# Patient Record
Sex: MALE | Race: WHITE | NOT HISPANIC OR LATINO | Employment: FULL TIME | URBAN - METROPOLITAN AREA
[De-identification: names, ages, dates, MRNs, and addresses within clinical notes are randomized per-mention and may not be internally consistent; named-entity substitution may affect disease eponyms.]

---
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
## 2019-06-19 ENCOUNTER — OFFICE VISIT (OUTPATIENT)
Dept: URGENT CARE | Facility: PHYSICIAN GROUP | Age: 19
End: 2019-06-19
Payer: COMMERCIAL

## 2019-06-19 VITALS
BODY MASS INDEX: 20.46 KG/M2 | OXYGEN SATURATION: 98 % | WEIGHT: 135 LBS | DIASTOLIC BLOOD PRESSURE: 70 MMHG | HEART RATE: 70 BPM | HEIGHT: 68 IN | SYSTOLIC BLOOD PRESSURE: 122 MMHG | TEMPERATURE: 98.9 F

## 2019-06-19 DIAGNOSIS — Z86.69 HISTORY OF CHIARI MALFORMATION: ICD-10-CM

## 2019-06-19 DIAGNOSIS — H53.8 BLURRY VISION, BILATERAL: ICD-10-CM

## 2019-06-19 DIAGNOSIS — R51.9 OCCIPITAL HEADACHE: ICD-10-CM

## 2019-06-19 PROCEDURE — 99214 OFFICE O/P EST MOD 30 MIN: CPT | Performed by: PHYSICIAN ASSISTANT

## 2019-06-19 ASSESSMENT — ENCOUNTER SYMPTOMS
DEPRESSION: 0
TINGLING: 1
HEADACHES: 1
SEIZURES: 0
DIZZINESS: 1
LOSS OF CONSCIOUSNESS: 0
VISUAL CHANGE: 1
COUGH: 0
ABDOMINAL PAIN: 0
SENSORY CHANGE: 1
NAUSEA: 0
CHILLS: 0
MYALGIAS: 0
VOMITING: 0
FEVER: 0

## 2019-06-19 NOTE — PROGRESS NOTES
Subjective:      Preethi Joseph is a 18 y.o. male who presents with Headache (x 2 hours, back of head, hx of brain surgery, finger numbness, dizzy)            Headache    This is a new problem. The current episode started today. The problem occurs constantly. The problem has been unchanged. The pain is located in the occipital region. The pain does not radiate. Similar to prior headaches: Patient denies history of headache. The quality of the pain is described as throbbing. The pain is at a severity of 3/10. The pain is mild. Associated symptoms include dizziness, tingling and a visual change. Pertinent negatives include no abdominal pain, coughing, fever, hearing loss, nausea, seizures or vomiting. Exacerbated by: Patient reports tilting his head back to drink water and then having associated headache, blurry vision and tingling in both hands. He has tried nothing for the symptoms. The treatment provided no relief. (History of Chiari malformation)     Past Medical History:   Diagnosis Date   • Chiari malformation type I (HCC)    • Retraction syndrome      No current outpatient prescriptions on file prior to visit.     No current facility-administered medications on file prior to visit.      No family history on file.  Social History     Social History   • Marital status: Single     Spouse name: N/A   • Number of children: N/A   • Years of education: N/A     Occupational History   • Not on file.     Social History Main Topics   • Smoking status: Never Smoker   • Smokeless tobacco: Never Used   • Alcohol use No   • Drug use: No   • Sexual activity: Not on file     Other Topics Concern   • Not on file     Social History Narrative   • No narrative on file     .    Review of Systems   Constitutional: Negative for chills and fever.   HENT: Negative for congestion and hearing loss.    Respiratory: Negative for cough.    Cardiovascular: Negative for chest pain.   Gastrointestinal: Negative for abdominal pain, nausea and  "vomiting.   Genitourinary: Negative.    Musculoskeletal: Negative for myalgias.   Skin: Negative for rash.   Neurological: Positive for dizziness, tingling, sensory change and headaches. Negative for seizures and loss of consciousness.   Psychiatric/Behavioral: Negative for depression.          Objective:     /70   Pulse 70   Temp 37.2 °C (98.9 °F)   Ht 1.727 m (5' 8\")   Wt 61.2 kg (135 lb)   SpO2 98%   BMI 20.53 kg/m²      Physical Exam       Gen.: Patient is A and O ×3, no acute distress, well-nourished well-hydrated  Vitals: Are listed and unremarkable  HEENT: Heads normocephalic, atraumatic, PERRLA, extraocular movements intact, TMs and oropharynx clear  Neck: Soft supple without cervical lymphadenopathy  Cardiovascular: Regular rate and rhythm normal S1 and S2. No murmurs, rubs or gallops  Lungs are clear to auscultation bilaterally. no wheezes rales or rhonchi  Abdomen is soft, nontender, nondistended with good bowel sounds, no hepatosplenomegaly  Skin: Is well perfused without evidence of rash or lesions  Neurological:  cranial nerves II through XII were assessed and intact.  Musculoskeletal: Full range of motion, 5 out of 5 strength against resistance  Neurovascularly: Intact with a 2 second cap refill, good distal pulses       Assessment/Plan:     1. Occipital headache      2. Blurry vision, bilateral      3. History of Chiari malformation    This could just be occipital headache, however given patient's history of Chiari malformation, I sent him to the emergency room to have further evaluation.  He may need CT of the head.  They are going to the ER at [facility].  Mom is driving him now    "

## 2020-01-14 ENCOUNTER — HOSPITAL ENCOUNTER (OUTPATIENT)
Dept: LAB | Facility: MEDICAL CENTER | Age: 20
End: 2020-01-14
Payer: COMMERCIAL

## 2020-01-14 PROCEDURE — 83013 H PYLORI (C-13) BREATH: CPT

## 2020-01-15 ENCOUNTER — HOSPITAL ENCOUNTER (OUTPATIENT)
Dept: RADIOLOGY | Facility: MEDICAL CENTER | Age: 20
End: 2020-01-15
Attending: PHYSICIAN ASSISTANT
Payer: COMMERCIAL

## 2020-01-15 DIAGNOSIS — R10.13 EPIGASTRIC PAIN: ICD-10-CM

## 2020-01-15 PROCEDURE — 76700 US EXAM ABDOM COMPLETE: CPT

## 2020-12-16 ENCOUNTER — OCCUPATIONAL MEDICINE (OUTPATIENT)
Dept: URGENT CARE | Facility: PHYSICIAN GROUP | Age: 20
End: 2020-12-16
Payer: COMMERCIAL

## 2020-12-16 ENCOUNTER — TELEPHONE (OUTPATIENT)
Dept: SCHEDULING | Facility: IMAGING CENTER | Age: 20
End: 2020-12-16

## 2020-12-16 VITALS
HEART RATE: 71 BPM | SYSTOLIC BLOOD PRESSURE: 132 MMHG | DIASTOLIC BLOOD PRESSURE: 90 MMHG | TEMPERATURE: 98.2 F | HEIGHT: 67 IN | WEIGHT: 135 LBS | OXYGEN SATURATION: 97 % | BODY MASS INDEX: 21.19 KG/M2

## 2020-12-16 DIAGNOSIS — S41.112A ARM LACERATION, LEFT, INITIAL ENCOUNTER: ICD-10-CM

## 2020-12-16 PROCEDURE — 12001 RPR S/N/AX/GEN/TRNK 2.5CM/<: CPT | Mod: 29 | Performed by: NURSE PRACTITIONER

## 2020-12-16 ASSESSMENT — PAIN SCALES - GENERAL: PAINLEVEL: NO PAIN

## 2020-12-16 NOTE — LETTER
[facility]  [address] [place] [address]  Phone:  [phone] - Fax:  [phone]   [facility] Progress Report and Disability Certification  Date of Service: 12/16/2020   No Show:  No  Date / Time of Next Visit: 12/23/2020   Claim Information   Patient Name: Preethi Joseph  Claim Number:     Employer: [employer]  Date of Injury: 12/16/2020     Insurer / TPA: [employer]  ID / SSN:     Occupation:   Diagnosis: The encounter diagnosis was Arm laceration, left, initial encounter.    Medical Information   Related to Industrial Injury? Yes    Subjective Complaints:  DOI: 12/16/2020: Patient was moving stacks of pallets when one of the pallets fell forward, cutting his left forearm.  Washed it copiously with water at work, and completed his shift.  Has minimal pain, no bleeding since initial injury.  Able to move right upper extremity, hand and fingers without difficulty.  Does not have a second job.  Is right-handed.  No prior injury.    PMH: No pertinent past medical history to this problem   MEDS: Medications were reviewed in Epic   ALLERGIES: Allergies were reviewed in Epic   SOCHX: Works at [employer]   FH: No pertinent family history to this problem      Objective Findings: Left anterior forearm: 2 cm diagonal laceration present with very minimal bleeding, no drainage.  No underlying redness, minimal swelling.  Equal  and strength.  Distal N/V intact.   Pre-Existing Condition(s): None   Assessment:   Initial Visit    Status: Additional Care Required  Permanent Disability:No    Plan: Medication    Diagnostics:      Comments:  Sutures needed    Disability Information   Status: Released to Restricted Duty    From:  12/16/2020  Through: 12/23/2020 Restrictions are: Temporary   Physical Restrictions   Sitting:    Standing:    Stooping:    Bending:      Squatting:    Walking:    Climbing:    Pushing:      Pulling:    Other:    Reaching Above  Shoulder (L):   Reaching Above Shoulder (R):       Reaching Below Shoulder (L):    Reaching Below Shoulder (R):      Not to exceed Weight Limits   Carrying(hrs):   Weight Limit(lb): < or = to 10 pounds Lifting(hrs):   Weight  Limit(lb): < or = to 10 pounds   Comments: Restrictions for left upper extremity only.  Must keep wound covered, clean and dry at all times.    Repetitive Actions   Hands: i.e. Fine Manipulations from Grasping:     Feet: i.e. Operating Foot Controls:     Driving / Operate Machinery:     Provider Name:   SANDRA Mcconnell Physician Signature:  Physician Name:     Clinic Name / Location: [address] [address] Clinic Phone Number: Dept: [phone]   Appointment Time: 4:45 Pm Visit Start Time: 5:38 PM   Check-In Time:  4:51 Pm Visit Discharge Time: 6:55 PM   Original-Treating Physician or Chiropractor    Page 2-Insurer/TPA    Page 3-Employer    Page 4-Employee

## 2020-12-16 NOTE — LETTER
"EMPLOYEE’S CLAIM FOR COMPENSATION/ REPORT OF INITIAL TREATMENT  FORM C-4    EMPLOYEE’S CLAIM - PROVIDE ALL INFORMATION REQUESTED   First Name  Preethi Last Name  Opal Birthdate                    2000                Sex  male Claim Number   Home Address  [id number] Age  20 y.o. Height  1.702 m (5' 7\") Weight  61.2 kg (135 lb) [facility]     [facility] Zip  [address] Telephone  [phone] (home)    Mailing Address  [id number] [facility] Zip  [address] Primary Language Spoken  English    Insurer   Third Party   [employer]   Employee's Occupation (Job Title) When Injury or Occupational Disease Occurred      Employer's Name  [employer]  Telephone  [phone]    Employer Address  [address]  [place]  Zip  [address]    Date of Injury  12/16/2020               Hour of Injury  5:00 AM Date Employer Notified  12/16/2020 Last Day of Work after Injury     or Occupational Disease  12/16/2020 Supervisor to Whom Injury     Reported  Hardik Montano   Address or Location of Accident (if applicable)  [[address]]   What were you doing at the time of accident? (if applicable)  lifting pallets to re-intro parts    How did this injury or occupational disease occur? (Be specific an answer in detail. Use additional sheet if necessary)  I was lifting an empty pallet when the conveyer brought the next part forward catching my wrist, cutting it.   If you believe that you have an occupational disease, when did you first have knowledge of the disability and it relationship to your employment?  n/a Witnesses to the Accident  N/A      Nature of Injury or Occupational Disease  Laceration  Part(s) of Body Injured or Affected  Lower Arm (L), Lower Arm (L), Lower Arm (L)    I certify that the above is true and correct to the best of my knowledge and that I have " provided this information in order to obtain the benefits of [place]  I hereby authorize any physician, chiropractor, surgeon, practitioner, or other person, any hospital, including [facility] or [facility] hospital, any medical service organization, any insurance company, or other institution or organization to release to each other, any medical or other information, including benefits paid or payable, pertinent to this injury or disease, except information relative to diagnosis, treatment and/or counseling for AIDS, psychological conditions, alcohol or controlled substances, for which I must give specific authorization.  A Photostat of this authorization shall be as valid as the original.     Date   Place   Employee’s Signature   THIS REPORT MUST BE COMPLETED AND MAILED WITHIN 3 WORKING DAYS OF TREATMENT   Place  [facility]  Name of Facility  [facility]   Date  12/16/2020 Diagnosis  (S41.112A) Arm laceration, left, initial encounter Is there evidence the injured employee was under the              influence of alcohol and/or another controlled substance at the time of accident?   Hour  5:38 PM Description of Injury or Disease  The encounter diagnosis was Arm laceration, left, initial encounter. No   Treatment  3 sutures placed, dressing applied.  Have you advised the patient to remain off work five days or     more? No   X-Ray Findings      If Yes   From Date  To Date      From information given by the employee, together with medical evidence, can you directly connect this injury or occupational disease as job incurred?  Yes If No Full Duty    No Modified Duty  Yes   Is additional medical care by a physician indicated?  Yes If Modified Duty, Specify any Limitations / Restrictions  See D39.   Do you know of any previous injury or disease contributing to this condition or occupational  "disease?                            No   Date  12/16/2020 Print Doctor’s Name   SANDRA Mcconnell I certify the employer’s copy of  this form was mailed on:   Address  [address] Insurer’s Use Only     [facility] Zip  [address]    Provider’s Tax ID Number  [id number] Telephone  Dept: [phone]      e-ZACH Kaur  Signature:     Degree          ORIGINAL-TREATING PHYSICIAN OR CHIROPRACTOR    PAGE 2-INSURER/TPA    PAGE 3-EMPLOYER    PAGE 4-EMPLOYEE        Form C-4 (rev.10/07)          BRIEF DESCRIPTION OF RIGHTS AND BENEFITS  (Pursuant to [place] 616C.[place])    Notice of Injury or Occupational Disease (Incident Report Form C-1): If an injury or occupational disease (OD) arises out of and in the course of employment, you must provide written notice to your employer as soon as practicable, but no later than 7 days after the accident or OD. Your employer shall maintain a sufficient supply of the required forms.     Claim for Compensation (Form C-4): If medical treatment is sought, the form C-4 is available at the place of initial treatment. A completed \"Claim for Compensation\" (Form C-4) must be filed within 90 days after an accident or OD. The treating physician or chiropractor must, within 3 working days after treatment, complete and mail to the employer, the employer's insurer and third-party , the Claim for Compensation.     Medical Treatment: If you require medical treatment for your on-the-job injury or OD, you may be required to select a physician or chiropractor from a list provided by your workers’ compensation insurer, if it has contracted with an Organization for Managed Care (MCO) or Preferred Provider Organization (PPO) or providers of health care. If your employer has not entered into a contract with an MCO or PPO, you may select a physician or chiropractor from the Panel of Physicians and Chiropractors. Any medical costs related to your industrial " injury or OD will be paid by your insurer.     Temporary Total Disability (TTD): If your doctor has certified that you are unable to work for a period of at least 5 consecutive days, or 5 cumulative days in a 20-day period, or places restrictions on you that your employer does not accommodate, you may be entitled to TTD compensation.     Temporary Partial Disability (TPD): If the wage you receive upon reemployment is less than the compensation for TTD to which you are entitled, the insurer may be required to pay you TPD compensation to make up the difference. TPD can only be paid for a maximum of 24 months.     Permanent Partial Disability (PPD): When your medical condition is stable and there is an indication of a PPD as a result of your injury or OD, within 30 days, your insurer must arrange for an evaluation by a rating physician or chiropractor to determine the degree of your PPD. The amount of your PPD award depends on the date of injury, the results of the PPD evaluation and your age and wage.     Permanent Total Disability (PTD): If you are medically certified by a treating physician or chiropractor as permanently and totally disabled and have been granted a PTD status by your insurer, you are entitled to receive monthly benefits not to exceed 66 2/3% of your average monthly wage. The amount of your PTD payments is subject to reduction if you previously received a PPD award.     Vocational Rehabilitation Services: You may be eligible for vocational rehabilitation services if you are unable to return to the job due to a permanent physical impairment or permanent restrictions as a result of your injury or occupational disease.     Transportation and Per Sandra Reimbursement: You may be eligible for travel expenses and per sandra associated with medical treatment.     Reopening: You may be able to reopen your claim if your condition worsens after claim closure.     Appeal Process: If you disagree with a written  determination issued by the insurer or the insurer does not respond to your request, you may appeal to the Department of Administration, , by following the instructions contained in your determination letter. You must appeal the determination within 70 days from the date of the determination letter at [address], [address], [place], [place] [address], or [address], [address], [place], [place] [address]. If you disagree with the  decision, you may appeal to the Department of Administration, . You must file your appeal within 30 days from the date of the  decision letter at [address], [address], [place], [place] [address], or [address], [address], [place], [place] [address]. If you disagree with a decision of an , you may file a petition for judicial review with the District Court. You must do so within 30 days of the Appeal Officer’s decision. You may be represented by an  at your own expense or you may contact the [facility] for possible representation.     [place]  for Injured Workers (NAIW): If you disagree with a  decision, you may request that NAIW represent you without charge at an  Hearing. For information regarding denial of benefits, you may contact the [facility] at: [address], [address], [place] [address], [phone], or [address], [address], [place] [address], [phone]     To File a Complaint with the Division: If you wish to file a complaint with the  of the Division of Industrial Relations (DIR), please contact the Workers’ Compensation Section, [address], [place], [place] [address], telephone [phone], or [address], [address], [place], [place] [address], telephone [phone].     For assistance with Workers’ Compensation Issues: You may contact the Office of the  [facility] Consumer Health Assistance, [address], [place] [address], Toll Free 1-139.746.7150, Web site: http://govcha.[facility].nv., E-mail [email or web address].              __________________________________________________________________                              ___________________         Employee Name / Signature                                                                                                                     Date                                                                                                                                                                                       D-2 (rev. 01/20)

## 2020-12-17 ASSESSMENT — ENCOUNTER SYMPTOMS
FEVER: 0
SORE THROAT: 0
MYALGIAS: 0
ORTHOPNEA: 0
WEAKNESS: 0
DIZZINESS: 0
VOMITING: 0
COUGH: 0
SHORTNESS OF BREATH: 0
NERVOUS/ANXIOUS: 0
ABDOMINAL PAIN: 0
CHILLS: 0
NAUSEA: 0
EYE PAIN: 0
HEADACHES: 0

## 2020-12-17 NOTE — PROGRESS NOTES
"Subjective:   Preethi Joseph is a 20 y.o. male who presents for Laceration (left wrist LAC from a metal stair, last Tdap unknown)    DOI: 12/16/2020: Patient was moving stacks of pallets when one of the pallets fell forward, cutting his left forearm.  Washed it copiously with water at work, and completed his shift.  Has minimal pain, no bleeding since initial injury.  Able to move right upper extremity, hand and fingers without difficulty.  Does not have a second job.  Is right-handed.  No prior injury.    PMH: No pertinent past medical history to this problem   MEDS: Medications were reviewed in Epic   ALLERGIES: Allergies were reviewed in Epic   SOCHX: Works at [employer]   FH: No pertinent family history to this problem     Laceration         Review of Systems   Constitutional: Negative for chills, fever and malaise/fatigue.   HENT: Negative for congestion and sore throat.    Eyes: Negative for pain.   Respiratory: Negative for cough and shortness of breath.    Cardiovascular: Negative for chest pain, orthopnea and leg swelling.   Gastrointestinal: Negative for abdominal pain, nausea and vomiting.   Genitourinary: Negative for dysuria.   Musculoskeletal: Negative for myalgias.   Skin: Negative for rash.        Laceration, left arm   Neurological: Negative for dizziness, weakness and headaches.   Psychiatric/Behavioral: The patient is not nervous/anxious.    All other systems reviewed and are negative.      MEDS: No current outpatient medications on file.  ALLERGIES: No Known Allergies    Patient's PMH, SocHx, SurgHx, FamHx, Drug allergies and medications were reviewed.     Objective:   /90   Pulse 71   Temp 36.8 °C (98.2 °F)   Ht 1.702 m (5' 7\")   Wt 61.2 kg (135 lb)   SpO2 97%   BMI 21.14 kg/m²     Physical Exam  Vitals signs and nursing note reviewed.   Constitutional:       General: He is awake.      Appearance: Normal appearance. He is well-developed and normal weight.   HENT:      Head: Normocephalic " and atraumatic.      Right Ear: External ear normal.      Left Ear: External ear normal.      Nose: Nose normal.      Mouth/Throat:      Lips: Pink.      Mouth: Mucous membranes are moist.      Pharynx: Oropharynx is clear.   Eyes:      General: Lids are normal.      Extraocular Movements: Extraocular movements intact.      Conjunctiva/sclera: Conjunctivae normal.   Neck:      Musculoskeletal: Normal range of motion.   Cardiovascular:      Rate and Rhythm: Normal rate and regular rhythm.   Pulmonary:      Effort: Pulmonary effort is normal.   Musculoskeletal: Normal range of motion.   Skin:     General: Skin is warm and dry.             Comments: Left anterior forearm: 2 cm diagonal laceration present with very minimal bleeding, no drainage.  No underlying redness, minimal swelling.  Equal  and strength.  Distal N/V intact.   Neurological:      Mental Status: He is alert and oriented to person, place, and time.   Psychiatric:         Mood and Affect: Mood normal.         Behavior: Behavior normal. Behavior is cooperative.         Thought Content: Thought content normal.         Judgment: Judgment normal.       Procedure: Laceration Repair  -Risks including bleeding, nerve damage, infection, and poor cosmetic outcome discussed. Benefits and alternatives discussed.   -Clean technique with sterile instruments and suture used  -Local anesthesia with 5cc of 2% Xylocaine with lidocaine  -Closed with #3 4-0 Nylon interrupted sutures with good wound approximation  -Polysporin and dressing placed  -Patient tolerated well      Assessment/Plan:   Assessment    1. Arm laceration, left, initial encounter    See D39.   Wound care instructions provided.   Can use OTC analgesics, elevated and/or ice as needed.   Worsening/infection precautions given.   Return in 7-10 days for suture removal.

## 2020-12-23 ENCOUNTER — OCCUPATIONAL MEDICINE (OUTPATIENT)
Dept: URGENT CARE | Facility: PHYSICIAN GROUP | Age: 20
End: 2020-12-23
Payer: COMMERCIAL

## 2020-12-23 VITALS
BODY MASS INDEX: 21.19 KG/M2 | OXYGEN SATURATION: 97 % | WEIGHT: 135 LBS | DIASTOLIC BLOOD PRESSURE: 70 MMHG | HEIGHT: 67 IN | SYSTOLIC BLOOD PRESSURE: 116 MMHG | TEMPERATURE: 98.2 F | HEART RATE: 75 BPM

## 2020-12-23 DIAGNOSIS — S41.112D ARM LACERATION, LEFT, SUBSEQUENT ENCOUNTER: ICD-10-CM
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
## 2020-12-23 PROCEDURE — 99213 OFFICE O/P EST LOW 20 MIN: CPT | Performed by: NURSE PRACTITIONER

## 2020-12-23 ASSESSMENT — ENCOUNTER SYMPTOMS
NEUROLOGICAL NEGATIVE: 1
MUSCULOSKELETAL NEGATIVE: 1
ROS SKIN COMMENTS: LEFT FOREARM LACERATION
CONSTITUTIONAL NEGATIVE: 1

## 2020-12-23 ASSESSMENT — VISUAL ACUITY: OU: 1

## 2020-12-23 NOTE — PROGRESS NOTES
"Subjective:     Preethi Joseph is a 20 y.o. male who presents for Wrist Pain (left wrist pain wc fv, no pain)    Initial UC visit 12/16/2020 reviewed for continuity of care:    \"DOI: 12/16/2020: Patient was moving stacks of pallets when one of the pallets fell forward, cutting his left forearm.  Washed it copiously with water at work, and completed his shift.  Has minimal pain, no bleeding since initial injury.  Able to move right upper extremity, hand and fingers without difficulty.  Does not have a second job.  Is right-handed.  No prior injury.\"    1st follow-up UC visit today 12/23/2020:    Patient denies redness, swelling, fever, numbness, tingling, weakness, discharge, or other symptoms. Has kept original dressing on. Has been back to work within restrictions.     Patient was screened prior to rooming and denied COVID-19 diagnosis or contact with a person who has been diagnosed or is suspected to have COVID-19. During this visit, appropriate PPE was worn, hand hygiene was performed, and the patient and any visitors were masked.    PMH: No pertinent past medical history to this problem  MEDS: Medications were reviewed in Epic  ALLERGIES: Allergies were reviewed in Epic  SOCHX: Works as a  at [employer]   FH: No pertinent family history to this problem    Review of Systems   Constitutional: Negative.    Musculoskeletal: Negative.    Skin:        Left forearm laceration   Neurological: Negative.    All other systems reviewed and are negative.    Additional details per HPI.      Objective:     /70   Pulse 75   Temp 36.8 °C (98.2 °F)   Ht 1.702 m (5' 7\")   Wt 61.2 kg (135 lb)   SpO2 97%   BMI 21.14 kg/m²     Physical Exam  Vitals signs reviewed.   Constitutional:       General: He is not in acute distress.     Appearance: He is well-developed. He is not ill-appearing or toxic-appearing.   HENT:      Head: Normocephalic.      Right Ear: External ear normal.      Left Ear: External ear " normal.      Nose: Nose normal.   Eyes:      General: Vision grossly intact.      Extraocular Movements: Extraocular movements intact.      Conjunctiva/sclera: Conjunctivae normal.   Neck:      Musculoskeletal: Normal range of motion.   Cardiovascular:      Rate and Rhythm: Normal rate.      Pulses: Normal pulses.   Pulmonary:      Effort: Pulmonary effort is normal. No respiratory distress.   Musculoskeletal: Normal range of motion.         General: No deformity.      Left forearm: He exhibits laceration (Approx 2 cm straight laceration at left distal forearm, 3 sutures in place, wound edges well approximated, no surrounding erythema, edema, warmth, discharge). He exhibits no tenderness, no swelling and no deformity.   Skin:     General: Skin is warm and dry.      Capillary Refill: Capillary refill takes less than 2 seconds.      Coloration: Skin is not pale.   Neurological:      General: No focal deficit present.      Mental Status: He is alert and oriented to person, place, and time.      Sensory: No sensory deficit.      Motor: No weakness.      Coordination: Coordination normal.   Psychiatric:         Behavior: Behavior normal. Behavior is cooperative.       Assessment/Plan:     1. Arm laceration, left, subsequent encounter    2. Work related injury    Wound care performed.  Middle suture removed.  Deeper tissue layers healing well.  Superficial layers will need additional time to heal with sutures in place.  Laceration reinforced with Steri-Strip.  Discussed wound care.  Dressing change at least o  nce a day.  Keep covered while at work.  Leave open to air while at home.  Return in 5 days for recheck.  Return sooner if symptoms change or worsen.    Differential diagnosis, natural history, supportive care, over-the-counter symptom management per 's instructions, close monitoring, and indications for immediate follow-up discussed.     Patient advised to: Return in about 5 days (around  12/28/2020).    All questions answered. Patient agrees with the plan of care.

## 2020-12-23 NOTE — LETTER
"   [facility]  [address]  [place] [address]  Phone:  [phone] - Fax:  [phone]   [facility] Progress Report and Disability Certification  Date of Service: 12/23/2020   No Show:  No  Date / Time of Next Visit: 12/28/2020   Claim Information   Patient Name: Preethi Joseph  Claim Number:     Employer: [employer]  Date of Injury: 12/16/2020     Insurer / TPA: [employer]  ID / SSN:     Occupation:   Diagnosis: Diagnoses of Arm laceration, left, subsequent encounter and Work related injury were pertinent to this visit.    Medical Information   Related to Industrial Injury? Yes    Subjective Complaints:  Initial UC visit 12/16/2020 reviewed for continuity of care:    \"DOI: 12/16/2020: Patient was moving stacks of pallets when one of the pallets fell forward, cutting his left forearm.  Washed it copiously with water at work, and completed his shift.  Has minimal pain, no bleeding since initial injury.  Able to move right upper extremity, hand and fingers without difficulty.  Does not have a second job.  Is right-handed.  No prior injury.\"    1st follow-up UC visit today 12/23/2020:    Patient denies redness, swelling, fever, numbness, tingling, weakness, discharge, or other symptoms. Has kept original dressing on. Has been back to work within restrictions.   Objective Findings: Constitutional:       General: He is not in acute distress.     Appearance: He is well-developed. He is not ill-appearing or toxic-appearing.   Cardiovascular:      Rate and Rhythm: Normal rate.      Pulses: Normal pulses.   Musculoskeletal: Normal range of motion.         General: No deformity.      Left forearm: He exhibits laceration (Approx 2 cm straight laceration at left distal forearm, 3 sutures in place, wound edges well approximated, no surrounding erythema, edema, warmth, discharge). He exhibits no tenderness, no swelling and no deformity.   Skin:  " General: Skin is warm and dry.      Capillary Refill: Capillary refill takes less than 2 seconds.      Coloration: Skin is not pale.   Neurological:      General: No focal deficit present.      Mental Status: He is alert and oriented to person, place, and time.      Sensory: No sensory deficit.      Motor: No weakness.      Coordination: Coordination normal.   Pre-Existing Condition(s):     Assessment:   Condition Improved    Status: Additional Care Required  Permanent Disability:No    Plan:      Diagnostics:      Comments:  Wound care performed.  Middle suture removed.  Deeper tissue layers healing well.  Superficial layers will need additional time to heal with sutures in place.  Laceration reinforced with Steri-Strip.  Discussed wound care.  Dressing change at least o  nce a day.  Keep covered while at work.  Leave open to air while at home.  Return in 5 days for recheck.  Return sooner if symptoms change or worsen.    Disability Information   Status: Released to Restricted Duty    From:  12/23/2020  Through: 12/28/2020 Restrictions are: Temporary   Physical Restrictions   Sitting:    Standing:    Stooping:    Bending:      Squatting:    Walking:    Climbing:    Pushing:      Pulling:    Other:    Reaching Above Shoulder (L):   Reaching Above Shoulder (R):       Reaching Below Shoulder (L):    Reaching Below Shoulder (R):      Not to exceed Weight Limits   Carrying(hrs):   Weight Limit(lb): < or = to 10 pounds Lifting(hrs):   Weight  Limit(lb): < or = to 10 pounds   Comments: Restrictions apply to left upper extremity only    Repetitive Actions   Hands: i.e. Fine Manipulations from Grasping:     Feet: i.e. Operating Foot Controls:     Driving / Operate Machinery:     Provider Name:   SHA McPErvinRDEBBIE Physician Signature:  Physician Name:     Clinic Name / Location: [facility] [address] [address] Clinic Phone Number: Dept: [phone]   Appointment Time: 8:15 Am  Visit Start Time: 8:20 AM   Check-In Time:  8:05 Am Visit Discharge Time:  9:00 am   Original-Treating Physician or Chiropractor    Page 2-Insurer/TPA    Page 3-Employer    Page 4-Employee

## 2020-12-28 ENCOUNTER — OCCUPATIONAL MEDICINE (OUTPATIENT)
Dept: URGENT CARE | Facility: PHYSICIAN GROUP | Age: 20
End: 2020-12-28
Payer: COMMERCIAL

## 2020-12-28 VITALS
RESPIRATION RATE: 18 BRPM | TEMPERATURE: 97.6 F | SYSTOLIC BLOOD PRESSURE: 102 MMHG | WEIGHT: 135 LBS | BODY MASS INDEX: 21.19 KG/M2 | OXYGEN SATURATION: 98 % | HEART RATE: 64 BPM | HEIGHT: 67 IN | DIASTOLIC BLOOD PRESSURE: 70 MMHG

## 2020-12-28 DIAGNOSIS — Z48.02 VISIT FOR SUTURE REMOVAL: ICD-10-CM

## 2020-12-28 DIAGNOSIS — S41.112D LACERATION OF LEFT UPPER EXTREMITY, SUBSEQUENT ENCOUNTER: ICD-10-CM

## 2020-12-28 PROCEDURE — 99212 OFFICE O/P EST SF 10 MIN: CPT | Performed by: PHYSICIAN ASSISTANT

## 2020-12-28 ASSESSMENT — ENCOUNTER SYMPTOMS
TINGLING: 0
VOMITING: 0
FEVER: 0
NAUSEA: 0
SENSORY CHANGE: 0
CHILLS: 0

## 2020-12-28 NOTE — PROGRESS NOTES
"Subjective:      Preethi Joseph is a 20 y.o. male who presents with Work-Related Injury (FV/ DOI 12-/ L wrist )      DOI: 12/16/2020: \"Patient was moving stacks of pallets when one of the pallets fell forward, cutting his left forearm.  Washed it copiously with water at work, and completed his shift.  Has minimal pain, no bleeding since initial injury.  Able to move right upper extremity, hand and fingers without difficulty.  Does not have a second job.  Is right-handed.  No prior injury.\"    Patient presents for second follow-up visit.  He had one suture removed at last visit on 12/23/2020.  Patient reports laceration is healing well.  No surrounding redness, increased pain, discharge, or fevers.      PMH: No pertinent past medical history to this problem  MEDS: Medications were reviewed in Epic  ALLERGIES: Allergies were reviewed in Epic  SOCHX: Works at [employer]  FH: No pertinent family history to this problem         HPI    Review of Systems   Constitutional: Negative for chills and fever.   Gastrointestinal: Negative for nausea and vomiting.   Skin:        Laceration to left forearm   Neurological: Negative for tingling and sensory change.          Objective:     /70   Pulse 64   Temp 36.4 °C (97.6 °F) (Temporal)   Resp 18   Ht 1.702 m (5' 7\")   Wt 61.2 kg (135 lb)   SpO2 98%   BMI 21.14 kg/m²      Physical Exam  Vitals signs reviewed.   Constitutional:       Appearance: Normal appearance.   Cardiovascular:      Rate and Rhythm: Normal rate.   Pulmonary:      Effort: Pulmonary effort is normal. No respiratory distress.   Psychiatric:         Mood and Affect: Mood normal.         Behavior: Behavior normal.         Thought Content: Thought content normal.         Judgment: Judgment normal.         Patient is generally well-appearing and in no acute distress.  AA & O by 4.  Skin: Left distal forearm-Assessment: 2 sutures and steri-strip present. Wound clean, healing well, no s/s of infection.  No " surrounding erythema, warmth, discharge, or wound dehiscence.         Assessment/Plan:        1. Laceration of left upper extremity, subsequent encounter    2. Visit for suture removal    Plan: sutures removed without diff, discussed wound care and sunscreen to avoid scars.  Monitor for signs of infection.  Keep laceration covered with bandage while at work.  Avoid soaking wound or using large amounts of Neosporin.  Patient discharged MMI.  F/u prn.

## 2020-12-31 ENCOUNTER — PATIENT OUTREACH (OUTPATIENT)
Dept: MEDICAL GROUP | Facility: MEDICAL CENTER | Age: 20
End: 2020-12-31

## 2020-12-31 NOTE — PROGRESS NOTES
Left message for patient to call back regarding pre-visit planning. Please transfer call to Yulia at [phone] or Shefali at [phone].

## 2021-01-06 NOTE — PROGRESS NOTES
Left message for patient to call back regarding pre-visit planning. Please transfer call to Yulia at [phone] or Sheflai at [phone].

## 2021-01-07 ENCOUNTER — OFFICE VISIT (OUTPATIENT)
Dept: MEDICAL GROUP | Facility: MEDICAL CENTER | Age: 21
End: 2021-01-07
Payer: COMMERCIAL

## 2021-01-07 VITALS
SYSTOLIC BLOOD PRESSURE: 120 MMHG | BODY MASS INDEX: 21.97 KG/M2 | OXYGEN SATURATION: 94 % | TEMPERATURE: 98.6 F | HEART RATE: 71 BPM | DIASTOLIC BLOOD PRESSURE: 66 MMHG | WEIGHT: 140 LBS | HEIGHT: 67 IN

## 2021-01-07 DIAGNOSIS — Z23 NEED FOR VACCINATION: ICD-10-CM

## 2021-01-07 DIAGNOSIS — Z00.00 ANNUAL PHYSICAL EXAM: ICD-10-CM

## 2021-01-07 PROBLEM — Z86.69 HISTORY OF CHIARI MALFORMATION: Status: ACTIVE | Noted: 2021-01-07

## 2021-01-07 PROCEDURE — 90651 9VHPV VACCINE 2/3 DOSE IM: CPT | Performed by: NURSE PRACTITIONER

## 2021-01-07 PROCEDURE — 99395 PREV VISIT EST AGE 18-39: CPT | Mod: 25 | Performed by: NURSE PRACTITIONER

## 2021-01-07 PROCEDURE — 90621 MENB-FHBP VACC 2/3 DOSE IM: CPT | Performed by: NURSE PRACTITIONER

## 2021-01-07 PROCEDURE — 90472 IMMUNIZATION ADMIN EACH ADD: CPT | Performed by: NURSE PRACTITIONER

## 2021-01-07 PROCEDURE — 90471 IMMUNIZATION ADMIN: CPT | Performed by: NURSE PRACTITIONER

## 2021-01-07 PROCEDURE — 90686 IIV4 VACC NO PRSV 0.5 ML IM: CPT | Performed by: NURSE PRACTITIONER

## 2021-01-07 SDOH — HEALTH STABILITY: MENTAL HEALTH: HOW MANY STANDARD DRINKS CONTAINING ALCOHOL DO YOU HAVE ON A TYPICAL DAY?: 1 OR 2

## 2021-01-07 SDOH — HEALTH STABILITY: MENTAL HEALTH: HOW OFTEN DO YOU HAVE A DRINK CONTAINING ALCOHOL?: 2-3 TIMES A WEEK

## 2021-01-07 ASSESSMENT — PATIENT HEALTH QUESTIONNAIRE - PHQ9: CLINICAL INTERPRETATION OF PHQ2 SCORE: 0

## 2021-01-07 NOTE — LETTER
Critical access hospital  [facility]  [address]  [place] [address]  Fax: [phone]   Authorization for Release/Disclosure of   Protected Health Information   Name: WINTER ARCOS : 2000 SSN: [id number]   Address: [address] [place], [place] [address] [facility] [id number]  [facility] [address] Phone:    [phone] (home)    I authorize the entity listed below to release/disclose the PHI below to:   Critical access hospital/[facility] and [facility]   Provider or Entity Name:  Todd DEENA Dimas,    Address   City, State, Zip  [address], [place] [address] Phone: [phone]    Fax:   Reason for request: continuity of care   Information to be released:    [  ] LAST COLONOSCOPY,  including any PATH REPORT and follow-up  [  ] LAST FIT/COLOGUARD RESULT [  ] LAST DEXA  [  ] LAST MAMMOGRAM  [  ] LAST PAP  [  ] LAST LABS [  ] RETINA EXAM REPORT  [  ] IMMUNIZATION RECORDS  [ XXXX ] Release all info      [  ] Check here and initial the line next to each item to release ALL health information INCLUDING  _____ Care and treatment for drug and / or alcohol abuse  _____ HIV testing, infection status, or AIDS  _____ Genetic Testing    DATES OF SERVICE OR TIME PERIOD TO BE DISCLOSED: _____________  I understand and acknowledge that:  * This Authorization may be revoked at any time by you in writing, except if your health information has already been used or disclosed.  * Your health information that will be used or disclosed as a result of you signing this authorization could be re-disclosed by the recipient. If this occurs, your re-disclosed health information may no longer be protected by State or Federal laws.  * You may refuse to sign this Authorization. Your refusal will not affect your ability to obtain treatment.  * This Authorization becomes effective upon signing and will  on (date) __________.      If no date is indicated, this Authorization will  one (1) year from the signature date.     Name: Preethi Joseph    Signature:   Date:     1/7/2021       PLEASE FAX REQUESTED RECORDS BACK TO: [phone]

## 2021-01-07 NOTE — ASSESSMENT & PLAN NOTE
Social/Family: Single, no children  Work: [employer] full time  Diet: Vegetables weekly, variety meat, avoids fast/junk food. Moderate sugar/sweets, moderate carbs.   Caffeine/Energy Drinks/Soda: Avoids caffeine  Exercise: 2-3 times per week, cardio, weight lifting  Stress: low life stress  Sleep: Occasional issues, ave 6-7 hours per night  Depression/Anxiety Concerns: No concerns

## 2021-01-07 NOTE — PROGRESS NOTES
"Preethi Joseph is a 20 y.o. male here to establish care and for annual physical:  HPI:      Annual physical exam  Social/Family: Single, no children  Work: [employer] full time  Diet: Vegetables weekly, variety meat, avoids fast/junk food. Moderate sugar/sweets, moderate carbs.   Caffeine/Energy Drinks/Soda: Avoids caffeine  Exercise: 2-3 times per week, cardio, weight lifting  Stress: low life stress  Sleep: Occasional issues, ave 6-7 hours per night  Depression/Anxiety Concerns: No concerns      Current medicines (including changes today)  No current outpatient medications on file.     No current facility-administered medications for this visit.      He  has a past medical history of Chiari malformation type I (HCC), History of Chiari malformation (), and Retraction syndrome.  He  has a past surgical history that includes cranioplasty.  Social History     Tobacco Use   • Smoking status: Never Smoker   • Smokeless tobacco: Never Used   Substance Use Topics   • Alcohol use: Yes     Frequency: 2-3 times a week     Drinks per session: 1 or 2   • Drug use: No     Social History     Social History Narrative   • Not on file     Family History   Problem Relation Age of Onset   • Cancer Maternal Grandfather         pancreatic     Family Status   Relation Name Status   • MGFa           ROS  No chest pain, no abdominal pain, no rash.  Positive ROS as per HPI.  All other systems reviewed and are negative      Objective:     /66 (BP Location: Right arm, Patient Position: Sitting, BP Cuff Size: Adult)   Pulse 71   Temp 37 °C (98.6 °F) (Temporal)   Ht 1.702 m (5' 7\")   Wt 63.5 kg (140 lb)   SpO2 94%  Body mass index is 21.93 kg/m².  Physical Exam:    Constitutional: Alert, no distress.  Skin: Warm, dry, good turgor, no rashes in visible areas.  Eye: Equal, round and reactive, conjunctiva clear, lids normal.  ENMT: Lips without lesions, good dentition, oropharynx clear.  Neck: Trachea midline, no masses, no " thyromegaly. No cervical or supraclavicular lymphadenopathy.  Respiratory: Unlabored respiratory effort, lungs clear to auscultation, no wheezes, no ronchi.  Cardiovascular: Normal S1, S2, no murmur, no edema.  Abdomen: Soft, non-tender, no masses, no hepatosplenomegaly.  Psych: Alert and oriented x3, normal affect and mood.      Assessment and Plan:   The following treatment plan was discussed    1. Annual physical exam  Patient and I discussed the importance of lifestyle changes, with particular emphasis on decreasing sugar and carbohydrate intake and increasing plant-based nutrition (for the purposes of weight loss, general health, and prevention of chronic illnesses), as well as regular cardiovascular exercise, proper sleep, and stress management. Patient verbalized understanding.    2. Need for vaccination  - 9VHPV Vaccine 2-3 Dose IM  - Meningococcal (IM) Group B  - Influenza Vaccine Quad Injection (PF)      Followup: Return in about 1 year (around 1/7/2022).    I have placed the below orders and discussed them with an approved delegating provider. The MA is performing the below orders under the direction of Dr. Will

## 2021-02-11 ENCOUNTER — APPOINTMENT (OUTPATIENT)
Dept: MEDICAL GROUP | Facility: MEDICAL CENTER | Age: 21
End: 2021-02-11
Payer: COMMERCIAL

## 2022-07-04 ENCOUNTER — HOSPITAL ENCOUNTER (EMERGENCY)
Facility: MEDICAL CENTER | Age: 22
End: 2022-07-04
Attending: EMERGENCY MEDICINE
Payer: COMMERCIAL

## 2022-07-04 VITALS
OXYGEN SATURATION: 97 % | TEMPERATURE: 97.5 F | WEIGHT: 163.58 LBS | DIASTOLIC BLOOD PRESSURE: 78 MMHG | BODY MASS INDEX: 24.79 KG/M2 | RESPIRATION RATE: 14 BRPM | HEIGHT: 68 IN | HEART RATE: 77 BPM | SYSTOLIC BLOOD PRESSURE: 127 MMHG

## 2022-07-04 DIAGNOSIS — S41.012A LACERATION OF LEFT SHOULDER, INITIAL ENCOUNTER: ICD-10-CM

## 2022-07-04 PROCEDURE — 99282 EMERGENCY DEPT VISIT SF MDM: CPT

## 2022-07-04 PROCEDURE — 700111 HCHG RX REV CODE 636 W/ 250 OVERRIDE (IP): Performed by: EMERGENCY MEDICINE

## 2022-07-04 PROCEDURE — 90715 TDAP VACCINE 7 YRS/> IM: CPT | Performed by: EMERGENCY MEDICINE

## 2022-07-04 PROCEDURE — 90471 IMMUNIZATION ADMIN: CPT

## 2022-07-04 RX ADMIN — CLOSTRIDIUM TETANI TOXOID ANTIGEN (FORMALDEHYDE INACTIVATED), CORYNEBACTERIUM DIPHTHERIAE TOXOID ANTIGEN (FORMALDEHYDE INACTIVATED), BORDETELLA PERTUSSIS TOXOID ANTIGEN (GLUTARALDEHYDE INACTIVATED), BORDETELLA PERTUSSIS FILAMENTOUS HEMAGGLUTININ ANTIGEN (FORMALDEHYDE INACTIVATED), BORDETELLA PERTUSSIS PERTACTIN ANTIGEN, AND BORDETELLA PERTUSSIS FIMBRIAE 2/3 ANTIGEN 0.5 ML: 5; 2; 2.5; 5; 3; 5 INJECTION, SUSPENSION INTRAMUSCULAR at 06:16

## 2022-07-04 ASSESSMENT — LIFESTYLE VARIABLES: DO YOU DRINK ALCOHOL: NO

## 2022-07-04 NOTE — ED NOTES
Pt arrived to RM 27 from triage ambulating independently with steady gait, changed into gown, awake and alert, pt with even unlabored respirations, 3 wounds noted to L shoulder (1 abrasion, 2 gouge like lacerations) no bleeding noted at this time, scant amount of blood on dressing,  given call bell, awaiting MD evaluation.

## 2022-07-04 NOTE — LETTER
"  FORM C-4:  EMPLOYEE’S CLAIM FOR COMPENSATION/ REPORT OF INITIAL TREATMENT  EMPLOYEE’S CLAIM - PROVIDE ALL INFORMATION REQUESTED   First Name Preethi Last Name Opal Birthdate 2000  Sex male Claim Number   Home Address [address] [place] [address]   [facility]             [place] [address]                                   Age  21 y.o. Height  1.727 m (5' 8\") Weight  74.2 kg (163 lb 9.3 oz) [facility]     Mailing Address [address]  [facility]              Zip [address] Telephone  [phone] (home)  Primary Language Spoken   Insurer   Third Party   [place] [employer] Employee's Occupation (Job Title) When Injury or Occupational Disease Occurred     Employer's Name [employer] Telephone [phone]    Employer Address [address] [facility] [[address]] Zip [address]   Date of Injury  7/4/2022       Hour of Injury  3:30 AM Date Employer Notified  7/4/2022 Last Day of Work after Injury or Occupational Disease   Supervisor to Whom Injury Reported  Hardik Montano   Address or Location of Accident (if applicable) Work [1]   What were you doing at the time of accident? (if applicable) Fixing a jam inside cooling tunnels    How did this injury or occupational disease occur? Be specific and answer in detail. Use additional sheet if necessary)  Jam inside cooling tunnels required me to go inside tunnels to repair. While fixing jam, cooling fan Hit shoulder, causing two lacerations.   If you believe that you have an occupational disease, when did you first have knowledge of the disability and it relationship to your employment? N/A Witnesses to the Accident  N/A   Nature of Injury or Occupational Disease  Workers' Compensation Part(s) of Body Injured or Affected  Shoulder (L)   I CERTIFY THAT THE ABOVE IS TRUE AND CORRECT TO THE BEST OF MY KNOWLEDGE AND THAT I HAVE PROVIDED THIS INFORMATION IN ORDER TO OBTAIN THE BENEFITS OF [place]).  I HEREBY AUTHORIZE ANY PHYSICIAN, CHIROPRACTOR, SURGEON, PRACTITIONER, OR OTHER PERSON, ANY HOSPITAL, INCLUDING [facility], ANY MEDICAL SERVICE ORGANIZATION, ANY INSURANCE COMPANY, OR OTHER INSTITUTION OR ORGANIZATION TO RELEASE TO EACH OTHER, ANY MEDICAL OR OTHER INFORMATION, INCLUDING BENEFITS PAID OR PAYABLE, PERTINENT TO THIS INJURY OR DISEASE, EXCEPT INFORMATION RELATIVE TO DIAGNOSIS, TREATMENT AND/OR COUNSELING FOR AIDS, PSYCHOLOGICAL CONDITIONS, ALCOHOL OR CONTROLLED SUBSTANCES, FOR WHICH I MUST GIVE SPECIFIC AUTHORIZATION.  A PHOTOSTAT OF THIS AUTHORIZATION SHALL BE AS VALID AS THE ORIGINAL.  Date 07/03/2022                                     Place  [facility]                                                    Employee’s Signature   THIS REPORT MUST BE COMPLETED AND MAILED WITHIN 3 WORKING DAYS OF TREATMENT   Place [facility], EMERGENCY DEPT                       Name of Facility [facility]   Date  7/4/2022 Diagnosis  (S41.012A) Laceration of left shoulder, initial encounter Is there evidence the injured employee was under the influence of alcohol and/or another controlled substance at the time of accident?   Hour  6:22 AM Description of Injury or Disease  Laceration of left shoulder, initial encounter No   Treatment  Local wound care  Have you advised the patient to remain off work five days or more?         No   X-Ray Findings    If Yes   From Date    To Date      From information given by the employee, together with medical evidence, can you directly connect this injury or occupational disease as job incurred? Yes If No, is employee capable of: Full Duty  Yes Modified Duty      Is additional medical care by a physician indicated? Yes If Modified Duty, Specify any Limitations / Restrictions       Do you know of any previous injury or disease  "contributing to this condition or occupational disease? No    Date 7/4/2022 Print Doctor’s Name Zack Blackman certify the employer’s copy of this form was mailed on:   Address [address] [place] [address]  [place] [address] [phone] INSURER’S USE ONLY   Provider’s Tax ID Number [id number] Telephone Dept: [phone]    Doctor’s Signature e-ZACK Park M.D. Degree  MD      Form C-4 (rev.10/07)                                                                         BRIEF DESCRIPTION OF RIGHTS AND BENEFITS  (Pursuant to [place] 616C.050)    Notice of Injury or Occupational Disease (Incident Report Form C-1): If an injury or occupational disease (OD) arises out of and in the course of employment, you must provide written notice to your employer as soon as practicable, but no later than 7 days after the accident or OD. Your employer shall maintain a sufficient supply of the required forms.    Claim for Compensation (Form C-4): If medical treatment is sought, the form C-4 is available at the place of initial treatment. A completed \"Claim for Compensation\" (Form C-4) must be filed within 90 days after an accident or OD. The treating physician or chiropractor must, within 3 working days after treatment, complete and mail to the employer, the employer's insurer and third-party , the Claim for Compensation.    Medical Treatment: If you require medical treatment for your on-the-job injury or OD, you may be required to select a physician or chiropractor from a list provided by your workers’ compensation insurer, if it has contracted with an Organization for Managed Care (MCO) or Preferred Provider Organization (PPO) or providers of health care. If your employer has not entered into a contract with an MCO or PPO, you may select a physician or chiropractor from the Panel of Physicians and Chiropractors. Any medical costs related to your industrial injury or OD will be paid by your insurer.    Temporary Total " Disability (TTD): If your doctor has certified that you are unable to work for a period of at least 5 consecutive days, or 5 cumulative days in a 20-day period, or places restrictions on you that your employer does not accommodate, you may be entitled to TTD compensation.    Temporary Partial Disability (TPD): If the wage you receive upon reemployment is less than the compensation for TTD to which you are entitled, the insurer may be required to pay you TPD compensation to make up the difference. TPD can only be paid for a maximum of 24 months.    Permanent Partial Disability (PPD): When your medical condition is stable and there is an indication of a PPD as a result of your injury or OD, within 30 days, your insurer must arrange for an evaluation by a rating physician or chiropractor to determine the degree of your PPD. The amount of your PPD award depends on the date of injury, the results of the PPD evaluation, your age and wage.    Permanent Total Disability (PTD): If you are medically certified by a treating physician or chiropractor as permanently and totally disabled and have been granted a PTD status by your insurer, you are entitled to receive monthly benefits not to exceed 66 2/3% of your average monthly wage. The amount of your PTD payments is subject to reduction if you previously received a lump-sum PPD award.    Vocational Rehabilitation Services: You may be eligible for vocational rehabilitation services if you are unable to return to the job due to a permanent physical impairment or permanent restrictions as a result of your injury or occupational disease.    Transportation and Per Sandra Reimbursement: You may be eligible for travel expenses and per sandra associated with medical treatment.    Reopening: You may be able to reopen your claim if your condition worsens after claim closure.     Appeal Process: If you disagree with a written determination issued by the insurer or the insurer does not respond  to your request, you may appeal to the Department of Administration, , by following the instructions contained in your determination letter. You must appeal the determination within 70 days from the date of the determination letter at [address], [address], [place], [place] [address], or [address], [address], [place], [place] [address]. If you disagree with the  decision, you may appeal to the Department of Administration, . You must file your appeal within 30 days from the date of the  decision letter at [address], [address], [place], [place] [address], or [address], [address], [place], [place] [address]. If you disagree with a decision of an , you may file a petition for judicial review with the District Court. You must do so within 30 days of the Appeal Officer’s decision. You may be represented by an  at your own expense or you may contact the [facility] for possible representation.    [place]  for Injured Workers (NAIW): If you disagree with a  decision, you may request that NAIW represent you without charge at an  Hearing. For information regarding denial of benefits, you may contact the [facility] at: [address], [address], [place] [address], [phone], or [address], [address], [place] [address], [phone]    To File a Complaint with the Division: If you wish to file a complaint with the  of the Division of Industrial Relations (DIR),  please contact the Workers’ Compensation Section, [address], [address], [place], [place] [address], telephone [phone], or [address], [place], [place] [address], telephone [phone].    For assistance with Workers’ Compensation Issues: You may contact the [facility] Office for Consumer Health Assistance, [address], [address], [place] [address], Toll Free 1-926.320.7554, Web site: http://[facility].[place].gov/Programs/IMTIAZ E-mail: imtiaz@[facility].[place].gov  D-2 (rev. 10/20)              __________________________________________________________________                                    ____07/03/2022_____________            Employee Name / Signature                                                                                                                            Date

## 2022-07-04 NOTE — ED NOTES
Wound cleaned with sterile water, flushed, dressing applied, scant amount of bleeding noted after flushing wound, Pt dc'd, reviewed dc instructions, pt verbalized understanding, given copy of instructions, pt ambulatory with steady gait accompanied by SO, in NAD.

## 2022-07-04 NOTE — ED TRIAGE NOTES
Chief Complaint   Patient presents with   • Laceration     Pt states he leaned into a fan at work that has no cover and the blades cut his left shoulder, pt suffered two puncture wound to his left shoulder, bleeding controlled, dressing in place      Pt ambulatory to triage for above complaint.     Pt is alert/oriented and follows commands. Pt speaking in full sentences and responds appropriately to questions. No acute distress noted in triage and respirations are even and unlabored.     Pt placed in lobby and educated on triage process. Pt encouraged to alert staff for any changes in condition.

## 2022-07-04 NOTE — ED PROVIDER NOTES
"ED Provider Note      CHIEF COMPLAINT   Chief Complaint   Patient presents with   • Laceration     Pt states he leaned into a fan at work that has no cover and the blades cut his left shoulder, pt suffered two puncture wound to his left shoulder, bleeding controlled, dressing in place        HPI   Preethi Joseph is a 21 y.o. male who presents with left shoulder pain.  Patient was at work today when a fan but does not have a cover cut his left shoulder.  He sustained puncture and abrasion.  Mild pain at the location.  No ongoing bleeding after a dressing applied.  Denies numbness tingling weakness.    REVIEW OF SYSTEMS   As above    PAST MEDICAL HISTORY   Past Medical History:   Diagnosis Date   • Chiari malformation type I (HCC)    • History of Chiari malformation 2007    surgery at age 6,    • Retraction syndrome        SOCIAL HISTORY  Social History     Tobacco Use   • Smoking status: Never Smoker   • Smokeless tobacco: Never Used   Vaping Use   • Vaping Use: Never used   Substance Use Topics   • Alcohol use: Yes     Comment: occ   • Drug use: No       ALLERGIES   See chart    PHYSICAL EXAM  VITAL SIGNS: BP (!) 152/93   Pulse 98   Temp 36.5 °C (97.7 °F) (Temporal)   Resp 16   Ht 1.727 m (5' 8\")   Wt 74.2 kg (163 lb 9.3 oz)   SpO2 94%   BMI 24.87 kg/m²   Head: Atraumatic  Eyes: Eyes normal inspection  Neck: has full range of motion, normal inspection.  Constitutional: No acute distress   Cardiovascular: Normal heart rate. Pulses strong radial  Thorax & Lungs: No respiratory distress  Skin: 2 separate small puncture/flap wounds.  Associated abrasion.  Musculoskeletal: No focal bony tenderness compartments soft.  Neurologic:  Normal sensory and motor    COURSE & MEDICAL DECISION MAKING  Patient with puncture wound/minor laceration left shoulder.  Does not appear to be anything amenable to primary closure.  Advise localized wound care.  Wash daily soap and water antibiotic ointment.  Return to ER for any signs of " infection.  Patient should follow-up with occupational health.    FINAL IMPRESSION  1.  Laceration/puncture left shoulder      This dictation was created using voice recognition software. The accuracy of the dictation is limited to the abilities of the software. I expect there may be some errors of grammar and possibly content. The nursing notes were reviewed and certain aspects of this information were incorporated into this note.      Electronically signed by: Gerard Blackman M.D., 7/4/2022 6:08 AM

## 2023-07-23 ENCOUNTER — OFFICE VISIT (OUTPATIENT)
Dept: URGENT CARE | Facility: PHYSICIAN GROUP | Age: 23
End: 2023-07-23
Payer: COMMERCIAL

## 2023-07-23 VITALS
OXYGEN SATURATION: 98 % | BODY MASS INDEX: 25.11 KG/M2 | HEIGHT: 67 IN | HEART RATE: 66 BPM | RESPIRATION RATE: 14 BRPM | SYSTOLIC BLOOD PRESSURE: 126 MMHG | DIASTOLIC BLOOD PRESSURE: 80 MMHG | WEIGHT: 160 LBS | TEMPERATURE: 97.9 F

## 2023-07-23 DIAGNOSIS — R11.2 NAUSEA AND VOMITING, UNSPECIFIED VOMITING TYPE: ICD-10-CM

## 2023-07-23 PROCEDURE — 99213 OFFICE O/P EST LOW 20 MIN: CPT | Performed by: PHYSICIAN ASSISTANT

## 2023-07-23 PROCEDURE — 3079F DIAST BP 80-89 MM HG: CPT | Performed by: PHYSICIAN ASSISTANT

## 2023-07-23 PROCEDURE — 3074F SYST BP LT 130 MM HG: CPT | Performed by: PHYSICIAN ASSISTANT

## 2023-07-23 RX ORDER — ONDANSETRON 4 MG/1
4 TABLET, ORALLY DISINTEGRATING ORAL EVERY 6 HOURS PRN
Qty: 10 TABLET | Refills: 0 | Status: SHIPPED | OUTPATIENT
Start: 2023-07-23

## 2023-07-23 ASSESSMENT — ENCOUNTER SYMPTOMS
ABDOMINAL PAIN: 0
EYE DISCHARGE: 0
NAUSEA: 1
SORE THROAT: 0
SHORTNESS OF BREATH: 0
NUMBER OF EPISODES OF EMESIS TODAY: 1
COUGH: 1
DIARRHEA: 0
MYALGIAS: 1
HEADACHES: 0
CHILLS: 1
EYE REDNESS: 0
FEVER: 0

## 2023-07-23 NOTE — PROGRESS NOTES
Subjective     Preethi Joseph is a 22 y.o. male who presents with Emesis (Woke up today feeling flushed, emesis, cold sweats. )          This is a new problem.  The patient presents to clinic complaining of nausea and vomiting.  The patient states he woke up this afternoon to get ready to go to work when he developed nausea and cold sweats followed by 2 episodes of vomiting.  The patient reports continued nausea.  He reports no associated diarrhea.  He also reports no abdominal pain.  The patient states he has been sick with recent URI like symptoms.  He reports no associated fever.  The patient has not taken any OTC medications for his current symptoms.  The patient reports possible sick contacts.  He reports no poor food exposure or possible food poisoning.  The patient is requesting a note for work at this time.    Emesis  This is a new problem. The current episode started today (about 1 hour prior to arrival). Associated symptoms include chills, congestion, coughing, myalgias, nausea and vomiting (The patient reports 2 episodes of vomiting since the onset of his symptoms). Pertinent negatives include no abdominal pain, change in bowel habit, chest pain, fever, headaches, sore throat or urinary symptoms. He has tried nothing for the symptoms.     PMH:  has a past medical history of Chiari malformation type I (HCC), History of Chiari malformation (2007), and Retraction syndrome.  MEDS: No current outpatient medications on file.  ALLERGIES: No Known Allergies  SURGHX:   Past Surgical History:   Procedure Laterality Date    CRANIOPLASTY      Chiari malformation, skull surgery     SOCHX:  reports that he has never smoked. He has never used smokeless tobacco. He reports current alcohol use. He reports current drug use. Drug: Marijuana.  FH: Family history was reviewed, no pertinent findings to report      Review of Systems   Constitutional:  Positive for chills. Negative for fever.   HENT:  Positive for congestion.  "Negative for ear pain and sore throat.    Eyes:  Negative for discharge and redness.   Respiratory:  Positive for cough. Negative for shortness of breath.    Cardiovascular:  Negative for chest pain.   Gastrointestinal:  Positive for nausea and vomiting (The patient reports 2 episodes of vomiting since the onset of his symptoms). Negative for abdominal pain, change in bowel habit and diarrhea.   Musculoskeletal:  Positive for myalgias.   Neurological:  Negative for headaches.              Objective     /80   Pulse 66   Temp 36.6 °C (97.9 °F)   Resp 14   Ht 1.702 m (5' 7\")   Wt 72.6 kg (160 lb)   SpO2 98%   BMI 25.06 kg/m²      Physical Exam  Constitutional:       General: He is not in acute distress.     Appearance: Normal appearance. He is well-developed. He is not ill-appearing.   HENT:      Head: Normocephalic and atraumatic.      Right Ear: External ear normal.      Left Ear: External ear normal.      Nose: Nose normal.      Mouth/Throat:      Mouth: Mucous membranes are moist.      Pharynx: Oropharynx is clear. No posterior oropharyngeal erythema.   Eyes:      Extraocular Movements: Extraocular movements intact.      Conjunctiva/sclera: Conjunctivae normal.   Cardiovascular:      Rate and Rhythm: Normal rate and regular rhythm.      Heart sounds: Normal heart sounds.   Pulmonary:      Effort: Pulmonary effort is normal. No respiratory distress.      Breath sounds: Normal breath sounds. No wheezing.   Abdominal:      General: Bowel sounds are normal.      Palpations: Abdomen is soft.      Tenderness: There is abdominal tenderness in the epigastric area. There is no guarding or rebound.   Musculoskeletal:         General: Normal range of motion.      Cervical back: Normal range of motion and neck supple.   Skin:     General: Skin is warm and dry.   Neurological:      Mental Status: He is alert and oriented to person, place, and time.               Progress:  The patient declined Zofran today in " clinic.             Assessment & Plan          1. Nausea and vomiting, unspecified vomiting type  - ondansetron (ZOFRAN ODT) 4 MG TABLET DISPERSIBLE; Take 1 Tablet by mouth every 6 hours as needed for Nausea/Vomiting.  Dispense: 10 Tablet; Refill: 0    The patient's presenting symptoms and physical exam findings are consistent with nausea and vomiting.  On physical exam, patient had mild epigastric abdominal tenderness without guarding or rebound.  The patient's vital signs are within normal limits.  The remainder the patient's physical exam today in clinic was normal.  The patient is nontoxic and appears in no acute distress.  The patient's vital signs are stable and within normal limits.  He is afebrile today in clinic.  The cause of the patient's nausea and vomiting is unknown at this time.  The patient's nausea and vomiting could be related to the patient's concurrent viral illness.  The patient's nausea and vomiting could also related to possible food poisoning.  Advised patient to monitor for worsening signs or symptoms.  We will prescribe patient Zofran for symptomatic leaf of his nausea and vomiting.  Recommend OTC medications and supportive care for symptomatic management.  Recommend patient follow-up with his PCP.  Discussed return precautions with the patient, and he verbalized understanding.    Differential diagnoses, supportive care, and indications for immediate follow-up discussed with patient.   Instructed to return to clinic or nearest emergency department for any change in condition, further concerns, or worsening of symptoms..    OTC Tylenol or Motrin for fever/discomfort.  OTC Pepto-Bismol for symptomatic relief  Drink plenty of fluids  Seligman diet  Monitor for worsening signs or symptoms  Follow-up with PCP  Work note provided  Return to clinic or go to the ED if symptoms worsen or fail to improve, or if the patient should develop worsening/increasing/persistent nausea, vomiting, diarrhea,  abdominal pain, cough, congestion, ear pain, sore throat, chest pain, shortness of breath, decreased p.o. intake, signs of dehydration, fever/chills, and/or any concerning symptoms.        Discussed plan with the patient, and he agrees to the above.    I personally reviewed prior external notes and test results pertinent to today's visit.  I have independently reviewed and interpreted all diagnostics ordered during this urgent care visit.     Please note that this dictation was created using voice recognition software. I have made every reasonable attempt to correct obvious errors, but I expect that there may be errors of grammar and possibly content that I did not discover before finalizing the note.     This note was electronically signed by Shikha Millan PA-C

## 2023-07-23 NOTE — LETTER
July 23, 2023         Patient: Preethi Joseph   YOB: 2000   Date of Visit: 7/23/2023           To Whom it May Concern:    Preethi Joseph was seen in my clinic on 7/23/2023. Please excuse him from work 7/23/2023. He may return to work on 7/24/2023.    If you have any questions or concerns, please don't hesitate to call.        Sincerely,           Shikha Millan P.A.-C.  Electronically Signed

## 2023-07-24 ASSESSMENT — ENCOUNTER SYMPTOMS
CHANGE IN BOWEL HABIT: 0
VOMITING: 1